# Patient Record
Sex: FEMALE | Race: WHITE | Employment: FULL TIME | URBAN - METROPOLITAN AREA
[De-identification: names, ages, dates, MRNs, and addresses within clinical notes are randomized per-mention and may not be internally consistent; named-entity substitution may affect disease eponyms.]

---

## 2020-12-27 ENCOUNTER — HOSPITAL ENCOUNTER (EMERGENCY)
Facility: CLINIC | Age: 25
Discharge: HOME | End: 2020-12-27
Attending: EMERGENCY MEDICINE | Admitting: EMERGENCY MEDICINE
Payer: OTHER MISCELLANEOUS

## 2020-12-27 VITALS
HEIGHT: 66 IN | BODY MASS INDEX: 23.3 KG/M2 | RESPIRATION RATE: 14 BRPM | SYSTOLIC BLOOD PRESSURE: 106 MMHG | DIASTOLIC BLOOD PRESSURE: 72 MMHG | OXYGEN SATURATION: 100 % | TEMPERATURE: 97.8 F | WEIGHT: 145 LBS | HEART RATE: 95 BPM

## 2020-12-27 DIAGNOSIS — T59.91XA TOXIC EFFECT OF FUMES, ACCIDENTAL OR UNINTENTIONAL, INITIAL ENCOUNTER: ICD-10-CM

## 2020-12-27 LAB — COHGB MFR BLD: 1 % (ref 0–2)

## 2020-12-27 PROCEDURE — 99283 EMERGENCY DEPT VISIT LOW MDM: CPT

## 2020-12-27 PROCEDURE — 82375 ASSAY CARBOXYHB QUANT: CPT | Performed by: EMERGENCY MEDICINE

## 2020-12-27 ASSESSMENT — ENCOUNTER SYMPTOMS
HEADACHES: 1
DIZZINESS: 1
NAUSEA: 1
NUMBNESS: 1

## 2020-12-27 ASSESSMENT — MIFFLIN-ST. JEOR: SCORE: 1424.47
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
## 2020-12-27 NOTE — ED AVS SNAPSHOT
[facility] Emergency Dept  [address]  [facility] [address]  Phone: [phone]  Fax: [phone]                                    Fei Salomon   MRN: [id number]    Department: [facility] Emergency Dept   Date of Visit: 12/27/2020           After Visit Summary Signature Page    I have received my discharge instructions, and my questions have been answered. I have discussed any challenges I see with this plan with the nurse or doctor.    ..........................................................................................................................................  Patient/Patient Representative Signature      ..........................................................................................................................................  Patient Representative Print Name and Relationship to Patient    ..................................................               ................................................  Date                                   Time    ..........................................................................................................................................  Reviewed by Signature/Title    ...................................................              ..............................................  Date                                               Time          22EPIC Rev 08/18

## 2020-12-27 NOTE — LETTER
December 27, 2020      To Whom It May Concern:      Fei Salomon was seen in our Emergency Department today, 12/27/20.  I expect her condition to improve over the next 3-5 days, please allow off work 12/27/20 through 12/31/20.  She may return to work without restriction 01/01/2021.    Sincerely,            Brittany Dye RN

## 2020-12-27 NOTE — LETTER
December 27, 2020      To Whom It May Concern:      Fei Salomon was seen in our Emergency Department today, 12/27/20.  I expect her condition to improve over the next day.  She may return to work on 12/29/20 when improved.    Sincerely,        Raghavendra Cerrato, BSN, RN, PHN, KELI, CPEN
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
## 2020-12-28 NOTE — ED NOTES
Patient discharged home with discharge paperowork. Vital signs stable at discharge. Education provided regarding when to come back to ED. Pt verbalized understanding. All questions answered.
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
